# Patient Record
Sex: FEMALE | Race: WHITE | NOT HISPANIC OR LATINO | Employment: UNEMPLOYED | URBAN - METROPOLITAN AREA
[De-identification: names, ages, dates, MRNs, and addresses within clinical notes are randomized per-mention and may not be internally consistent; named-entity substitution may affect disease eponyms.]

---

## 2024-07-11 ENCOUNTER — HOSPITAL ENCOUNTER (EMERGENCY)
Facility: HOSPITAL | Age: 49
Discharge: HOME | End: 2024-07-11

## 2024-07-11 VITALS
OXYGEN SATURATION: 100 % | RESPIRATION RATE: 18 BRPM | DIASTOLIC BLOOD PRESSURE: 93 MMHG | BODY MASS INDEX: 25.31 KG/M2 | HEIGHT: 65 IN | TEMPERATURE: 98.1 F | SYSTOLIC BLOOD PRESSURE: 153 MMHG | HEART RATE: 91 BPM | WEIGHT: 151.9 LBS

## 2024-07-11 DIAGNOSIS — N30.01 ACUTE CYSTITIS WITH HEMATURIA: Primary | ICD-10-CM

## 2024-07-11 LAB
APPEARANCE UR: ABNORMAL
BILIRUB UR STRIP.AUTO-MCNC: NEGATIVE MG/DL
COLOR UR: YELLOW
GLUCOSE UR STRIP.AUTO-MCNC: NORMAL MG/DL
HCG UR QL IA.RAPID: NEGATIVE
KETONES UR STRIP.AUTO-MCNC: NEGATIVE MG/DL
LEUKOCYTE ESTERASE UR QL STRIP.AUTO: ABNORMAL
MUCOUS THREADS #/AREA URNS AUTO: ABNORMAL /LPF
NITRITE UR QL STRIP.AUTO: ABNORMAL
PH UR STRIP.AUTO: 5.5 [PH]
PROT UR STRIP.AUTO-MCNC: ABNORMAL MG/DL
RBC # UR STRIP.AUTO: ABNORMAL /UL
RBC #/AREA URNS AUTO: >20 /HPF
SP GR UR STRIP.AUTO: 1.01
SQUAMOUS #/AREA URNS AUTO: ABNORMAL /HPF
UROBILINOGEN UR STRIP.AUTO-MCNC: NORMAL MG/DL
WBC #/AREA URNS AUTO: >50 /HPF
WBC CLUMPS #/AREA URNS AUTO: ABNORMAL /HPF

## 2024-07-11 PROCEDURE — 81025 URINE PREGNANCY TEST: CPT | Performed by: NURSE PRACTITIONER

## 2024-07-11 PROCEDURE — 99283 EMERGENCY DEPT VISIT LOW MDM: CPT

## 2024-07-11 PROCEDURE — 87086 URINE CULTURE/COLONY COUNT: CPT | Mod: TRILAB,WESLAB | Performed by: NURSE PRACTITIONER

## 2024-07-11 PROCEDURE — 81001 URINALYSIS AUTO W/SCOPE: CPT | Performed by: NURSE PRACTITIONER

## 2024-07-11 RX ORDER — CEPHALEXIN 500 MG/1
500 CAPSULE ORAL ONCE
Status: DISCONTINUED | OUTPATIENT
Start: 2024-07-11 | End: 2024-07-11 | Stop reason: HOSPADM

## 2024-07-11 RX ORDER — CEPHALEXIN 500 MG/1
500 CAPSULE ORAL 3 TIMES DAILY
Qty: 21 CAPSULE | Refills: 0 | Status: SHIPPED | OUTPATIENT
Start: 2024-07-11 | End: 2024-07-18

## 2024-07-11 ASSESSMENT — COLUMBIA-SUICIDE SEVERITY RATING SCALE - C-SSRS
6. HAVE YOU EVER DONE ANYTHING, STARTED TO DO ANYTHING, OR PREPARED TO DO ANYTHING TO END YOUR LIFE?: NO
1. IN THE PAST MONTH, HAVE YOU WISHED YOU WERE DEAD OR WISHED YOU COULD GO TO SLEEP AND NOT WAKE UP?: NO
2. HAVE YOU ACTUALLY HAD ANY THOUGHTS OF KILLING YOURSELF?: NO

## 2024-07-11 ASSESSMENT — PAIN DESCRIPTION - DESCRIPTORS: DESCRIPTORS: PRESSURE

## 2024-07-11 ASSESSMENT — PAIN SCALES - GENERAL: PAINLEVEL_OUTOF10: 5 - MODERATE PAIN

## 2024-07-11 ASSESSMENT — PAIN DESCRIPTION - PROGRESSION: CLINICAL_PROGRESSION: NOT CHANGED

## 2024-07-11 ASSESSMENT — PAIN DESCRIPTION - PAIN TYPE: TYPE: ACUTE PAIN

## 2024-07-11 ASSESSMENT — PAIN DESCRIPTION - ONSET: ONSET: SUDDEN

## 2024-07-11 ASSESSMENT — PAIN DESCRIPTION - FREQUENCY: FREQUENCY: INTERMITTENT

## 2024-07-11 ASSESSMENT — PAIN DESCRIPTION - LOCATION: LOCATION: GROIN

## 2024-07-11 ASSESSMENT — PAIN - FUNCTIONAL ASSESSMENT: PAIN_FUNCTIONAL_ASSESSMENT: 0-10

## 2024-07-11 NOTE — ED PROVIDER NOTES
HPI   Chief Complaint   Patient presents with    Female Dysuria     I have the urge to pee but I do not totally void I have pressure in my pelvis and when I pee it is cloudy this has been going on since July 3 rd       HPI  See my MDM                  [place] Coma Scale Score: 15                     Patient History   Past Medical History:   Diagnosis Date    Benign neoplasm of unspecified breast     Fibroadenoma of breast    Dysplasia of cervix uteri, unspecified 01/23/2015    Cervical dysplasia or atypia    Other abnormal and inconclusive findings on diagnostic imaging of breast 01/29/2015    Abnormal ultrasound of breast    Personal history of other diseases of the digestive system 01/23/2015    History of Crohn's disease    Personal history of other diseases of the female genital tract 01/23/2015    Vaginal delivery    Personal history of other infectious and parasitic diseases 03/05/2018    History of herpes simplex infection    Personal history of other specified conditions 01/29/2015    History of breast lump    Personal history of other specified conditions     Parity 4     Past Surgical History:   Procedure Laterality Date    CERVICAL BIOPSY  W/ LOOP ELECTRODE EXCISION  01/23/2015    Cervical Loop Electrosurgical Excision (LEEP)    INCISIONAL BREAST BIOPSY  11/18/2016    Incisional Breast Biopsy     No family history on file.  Social History     Tobacco Use    Smoking status: Unknown    Smokeless tobacco: Not on file   Substance Use Topics    Alcohol use: Not on file    Drug use: Not on file       Physical Exam   ED Triage Vitals [07/11/24 1719]   Temperature Heart Rate Respirations BP   36.7 °C (98.1 °F) 91 18 (!) 153/93      Pulse Ox Temp Source Heart Rate Source Patient Position   100 % Oral Monitor Sitting      BP Location FiO2 (%)     Left arm --       Physical Exam  CONSTITUTIONAL: Vital signs reviewed as charted, well-developed and in no distress  Eyes: Extraocular muscles are intact. Pupils equal  round and reactive to light. Conjunctiva are pink.    ENT: Mucous membranes are moist. Tongue in the midline. Pharynx was without erythema or exudates, uvula midline  LUNGS: Breath sounds equal and clear to auscultation. Good air exchange, no wheezes rales or retractions, pulse oximetry is charted.  HEART: Regular rate and rhythm without murmur thrill or rub, strong tones, auscultation is normal.  ABDOMEN: Soft and nontender without guarding rebound rigidity or mass. Bowel sounds are present and normal in all quadrants. There is no palpable masses or aneurysms identified. No hepatosplenomegaly, normal abdominal exam.  Neuro: The patient is awake, alert and oriented ×3. Moving all 4 extremities and answering questions appropriately.   MUSCULOSKELETAL: The calves are nontender to palpation. Full gross active range of motion.   PSYCH: Awake alert oriented, normal mood and affect.  Skin:  Dry, normal color, warm to the touch, no rash present.      ED Course & MDM   Diagnoses as of 07/11/24 1801   Acute cystitis with hematuria       Medical Decision Making  History obtained from: patient    Vital signs, nursing notes, current medications, past medical history, Surgical history, allergies, social history, family History were reviewed.         HPI:  Patient 48-year-old female present emergency room today complaining of dysuria urinary frequency and urgency ongoing for about a week.  States pain now is moving in the left side towards her back.  Denies fever chills or night sweats.  Denies nausea vomiting diarrhea.  She is nontoxic well-appearing vital signs positive for hypertension but otherwise unremarkable.      10 point ROS was reviewed and negative except Noted above in HPI.  DDX: as listed above          MDM Summary/considerations:  Labs Reviewed   URINALYSIS WITH REFLEX CULTURE AND MICROSCOPIC - Abnormal       Result Value    Color, Urine Yellow      Appearance, Urine Ex.Turbid (*)     Specific Gravity, Urine 1.015       pH, Urine 5.5      Protein, Urine 200 (2+) (*)     Glucose, Urine Normal      Blood, Urine 0.5 (2+) (*)     Ketones, Urine NEGATIVE      Bilirubin, Urine NEGATIVE      Urobilinogen, Urine Normal      Nitrite, Urine 1+ (*)     Leukocyte Esterase, Urine 500 Daniella/µL (*)    MICROSCOPIC ONLY, URINE - Abnormal    WBC, Urine >50 (*)     WBC Clumps, Urine MANY      RBC, Urine >20 (*)     Squamous Epithelial Cells, Urine 1-9 (SPARSE)      Mucus, Urine 2+     HCG, URINE, QUALITATIVE - Normal    HCG, Urine NEGATIVE     URINE CULTURE   URINALYSIS WITH REFLEX CULTURE AND MICROSCOPIC    Narrative:     The following orders were created for panel order Urinalysis with Reflex Culture and Microscopic.  Procedure                               Abnormality         Status                     ---------                               -----------         ------                     Urinalysis with Reflex C...[[id number]]  Abnormal            Final result               Extra Urine Gray Tube[[id number]]                                                         Please view results for these tests on the individual orders.   EXTRA URINE GRAY TUBE     No orders to display     Medications   cephalexin (Keflex) capsule 500 mg (has no administration in time range)     New Prescriptions    CEPHALEXIN (KEFLEX) 500 MG CAPSULE    Take 1 capsule (500 mg) by mouth 3 times a day for 7 days.       I estimate there is LOW risk for EPIGLOTTITIS, PNEUMONIA, MENINGITIS,thus I consider the discharge disposition reasonable. Also, there is no evidence for peritonitis, sepsis, or toxicity. We have discussed the diagnosis and risks, and we agree with discharging home to follow-up with their primary doctor. We also discussed returning to the Emergency Department immediately if new or worsening symptoms occur. We have discussed the symptoms which are most concerning (e.g., changing or worsening pain, trouble swallowing or breathing, neck stiffness, fever) that  necessitate immediate return.    Urinalysis positive for UTI consistent with infection and exam.  Patient started on antibiotics first dose given here in the ED will follow PCP 1 to 2 days for reevaluation discharged home stable condition.    All of the patient's questions were answered to the best of my ability.  Patient states understanding that they have been screened for an emergency today and we have not found any etiology of symptoms that requires emergent treatment or admission to the hospital at this point. They understand that they have not had definitive care day and require follow-up for treatment of their condition. They also state understanding that they may have an emergent condition that may potentially have not of detected at this visit and they must return to the emergency department if they develop any worsening of symptoms or new complaints.      I have evaluated this patient, my supervising physician was available for consultation.        Critical Care: Not warranted at this time        This chart was completed using voice recognition transcription software. Please excuse any errors of transcription including grammatical, punctuation, syntax and spelling errors.  Please contact me with any questions regarding this chart.    Procedure  Procedures     TOO Prieto-SHERRY  07/11/24 1746

## 2024-07-14 LAB — BACTERIA UR CULT: ABNORMAL

## 2024-07-16 ENCOUNTER — TELEPHONE (OUTPATIENT)
Dept: PHARMACY | Facility: CLINIC | Age: 49
End: 2024-07-16

## 2025-01-29 ENCOUNTER — APPOINTMENT (OUTPATIENT)
Dept: PRIMARY CARE | Facility: CLINIC | Age: 50
End: 2025-01-29

## 2025-01-29 VITALS
SYSTOLIC BLOOD PRESSURE: 134 MMHG | HEIGHT: 65 IN | HEART RATE: 78 BPM | BODY MASS INDEX: 25.99 KG/M2 | WEIGHT: 156 LBS | DIASTOLIC BLOOD PRESSURE: 76 MMHG

## 2025-01-29 DIAGNOSIS — Z12.12 SCREENING FOR COLORECTAL CANCER: ICD-10-CM

## 2025-01-29 DIAGNOSIS — Z00.00 ROUTINE GENERAL MEDICAL EXAMINATION AT A HEALTH CARE FACILITY: Primary | ICD-10-CM

## 2025-01-29 DIAGNOSIS — Z12.31 ENCOUNTER FOR SCREENING MAMMOGRAM FOR MALIGNANT NEOPLASM OF BREAST: ICD-10-CM

## 2025-01-29 DIAGNOSIS — Z12.11 SCREENING FOR COLORECTAL CANCER: ICD-10-CM

## 2025-01-29 DIAGNOSIS — Z01.419 ENCOUNTER FOR ANNUAL ROUTINE GYNECOLOGICAL EXAMINATION: ICD-10-CM

## 2025-01-29 PROBLEM — D64.9 ANEMIA: Status: ACTIVE | Noted: 2025-01-29

## 2025-01-29 PROBLEM — R45.89: Status: RESOLVED | Noted: 2025-01-29 | Resolved: 2025-01-29

## 2025-01-29 PROBLEM — K50.90 CROHN'S DISEASE (MULTI): Status: ACTIVE | Noted: 2025-01-29

## 2025-01-29 PROBLEM — E55.9 VITAMIN D DEFICIENCY: Status: ACTIVE | Noted: 2025-01-29

## 2025-01-29 PROBLEM — O99.019 ANTEPARTUM ANEMIA (HHS-HCC): Status: RESOLVED | Noted: 2025-01-29 | Resolved: 2025-01-29

## 2025-01-29 PROCEDURE — 1036F TOBACCO NON-USER: CPT | Performed by: INTERNAL MEDICINE

## 2025-01-29 PROCEDURE — 99386 PREV VISIT NEW AGE 40-64: CPT | Performed by: INTERNAL MEDICINE

## 2025-01-29 PROCEDURE — 3008F BODY MASS INDEX DOCD: CPT | Performed by: INTERNAL MEDICINE

## 2025-01-29 PROCEDURE — 93000 ELECTROCARDIOGRAM COMPLETE: CPT | Performed by: INTERNAL MEDICINE

## 2025-01-29 ASSESSMENT — ENCOUNTER SYMPTOMS
SLEEP DISTURBANCE: 0
ACTIVITY CHANGE: 0
HYPERACTIVE: 0
ARTHRALGIAS: 0
NECK STIFFNESS: 0
SORE THROAT: 0
DIARRHEA: 0
HEMATURIA: 0
BRUISES/BLEEDS EASILY: 0
NAUSEA: 0
SEIZURES: 0
VOMITING: 0
CHEST TIGHTNESS: 0
DYSURIA: 0
VOICE CHANGE: 0
RHINORRHEA: 0
NERVOUS/ANXIOUS: 0
PALPITATIONS: 0
WHEEZING: 0
DIFFICULTY URINATING: 0
LIGHT-HEADEDNESS: 0
SINUS PAIN: 0
FATIGUE: 0
ABDOMINAL PAIN: 0
COLOR CHANGE: 0
DYSPHORIC MOOD: 0
WEAKNESS: 0
NECK PAIN: 0
CONSTIPATION: 0
SHORTNESS OF BREATH: 0
APPETITE CHANGE: 0
CHILLS: 0
FEVER: 0
MYALGIAS: 0
SINUS PRESSURE: 0
COUGH: 0
EYE ITCHING: 0
ADENOPATHY: 0
DECREASED CONCENTRATION: 0
DIZZINESS: 0
FREQUENCY: 0
HEADACHES: 0
EYE DISCHARGE: 0
ABDOMINAL DISTENTION: 0
BACK PAIN: 0

## 2025-01-29 NOTE — PATIENT INSTRUCTIONS
It was a pleasure seeing you back in the office today.  At a time that is convenient for you, please obtain your bloodwork on a fasting basis.  We will then follow up on these results once available.  Please continue with routine gynecologic exams and annual mammograms.  Someone should be contacting you shortly to help set up a colonoscopy.  All other screening will start age-appropriate times in the future.  Tetanus vaccine should be up-to-date from your pregnancy in 2018, recommended every 10 years.  Other recommended vaccines would be annual flu shots, updated COVID boosters, and the shingles vaccine series starting at age 50.  If you have any questions, please feel free to contact us.  Otherwise, we are happy to see you annually for your wellness visits.

## 2025-01-29 NOTE — PROGRESS NOTES
Lou Danielle comes in today for a comprehensive physical exam.      Ms. Danielle comes in today to reestablish with PCP and for comprehensive physical.  She was last seen in our office in 2019.  She takes no chronic prescription medications and is generally healthy.  She is due for some updated healthcare maintenance studies, would like to set up with a new gynecologist.  She declines vaccines at this time.  She is amenable to updating comprehensive lab studies.  She still has relatively normal menstrual cycles.  She stays active.  She has 3 older children as well as her 6-year-old son.  She denies any headaches, dizziness, chest pain or palpitations, shortness of breath nor cough, nausea, vomiting, nor changes in bowel nor bladder habits.        Review of Systems   Constitutional:  Negative for activity change, appetite change, chills, fatigue and fever.   HENT:  Negative for congestion, ear pain, postnasal drip, rhinorrhea, sinus pressure, sinus pain, sneezing, sore throat, tinnitus and voice change.    Eyes:  Negative for discharge, itching and visual disturbance.   Respiratory:  Negative for cough, chest tightness, shortness of breath and wheezing.    Cardiovascular:  Negative for chest pain, palpitations and leg swelling.   Gastrointestinal:  Negative for abdominal distention, abdominal pain, constipation, diarrhea, nausea and vomiting.   Endocrine: Negative for cold intolerance, heat intolerance and polyuria.   Genitourinary:  Negative for difficulty urinating, dysuria, frequency, hematuria, urgency, vaginal bleeding and vaginal discharge.   Musculoskeletal:  Negative for arthralgias, back pain, myalgias, neck pain and neck stiffness.   Skin:  Negative for color change, pallor and rash.   Allergic/Immunologic: Negative for environmental allergies, food allergies and immunocompromised state.   Neurological:  Negative for dizziness, seizures, syncope, weakness, light-headedness and headaches.    Hematological:  Negative for adenopathy. Does not bruise/bleed easily.   Psychiatric/Behavioral:  Negative for behavioral problems, decreased concentration, dysphoric mood and sleep disturbance. The patient is not nervous/anxious and is not hyperactive.        Objective   Physical Exam  Constitutional:       General: She is not in acute distress.     Appearance: Normal appearance. She is well-developed. She is not ill-appearing.   HENT:      Head: Normocephalic.      Right Ear: Tympanic membrane, ear canal and external ear normal.      Left Ear: Tympanic membrane, ear canal and external ear normal.      Nose: Nose normal. No congestion.      Mouth/Throat:      Mouth: Mucous membranes are moist.      Pharynx: Oropharynx is clear. No oropharyngeal exudate or posterior oropharyngeal erythema.   Eyes:      General: Lids are normal. No scleral icterus.     Extraocular Movements: Extraocular movements intact.      Conjunctiva/sclera: Conjunctivae normal.      Pupils: Pupils are equal, round, and reactive to light.   Neck:      Vascular: No carotid bruit.   Cardiovascular:      Rate and Rhythm: Normal rate and regular rhythm.      Pulses: Normal pulses.      Heart sounds: No murmur heard.     No gallop.   Pulmonary:      Effort: Pulmonary effort is normal. No respiratory distress.      Breath sounds: No wheezing, rhonchi or rales.   Chest:      Comments: Patient declines  Abdominal:      General: Bowel sounds are normal. There is no distension.      Palpations: Abdomen is soft. There is no mass.      Tenderness: There is no abdominal tenderness. There is no guarding or rebound.   Genitourinary:     Comments: Patient declines, prefers to defer to gynecology  Musculoskeletal:         General: No swelling or signs of injury.      Cervical back: Normal range of motion and neck supple. No tenderness.      Right lower leg: No edema.      Left lower leg: No edema.   Lymphadenopathy:      Cervical: No cervical adenopathy.    Skin:     General: Skin is warm and dry.      Coloration: Skin is not pale.      Findings: No bruising or rash.   Neurological:      General: No focal deficit present.      Mental Status: She is alert and oriented to person, place, and time.      Cranial Nerves: No cranial nerve deficit.      Motor: No weakness.      Coordination: Coordination normal.      Gait: Gait normal.   Psychiatric:         Mood and Affect: Mood normal.         Behavior: Behavior normal.         Thought Content: Thought content normal.         Judgment: Judgment normal.         Assessment/Plan   Full age-appropriate comprehensive physical exam and health care maintenance performed and discussed today.  Routine safety and preventative measures discussed including self breast exam, seatbelt use, no drinking and driving, no texting and driving, abstinence or cessation of tobacco use, routine dental and vision exams, healthy diet and regular exercise.    We will update comprehensive labs and follow-up on results once available.  She will return when fasting.  Due for mammogram.  Requisition placed.  Overdue for Pap smear, have offered, she prefers to set up with gynecology.  Due for colonoscopy.  Order placed.  EKG as above.  DEXA screening start age-appropriate times in the future.  Declines flu shot, declines COVID booster.  Could consider shingles vaccine series when she turns 50.  Believes she is up-to-date on her tetanus vaccine likely from her pregnancy in 2018.    Happy to see her back essentially annually.  She is to contact us with any questions in the interim.  Problem List Items Addressed This Visit    None  Visit Diagnoses       Routine general medical examination at a health care facility    -  Primary    Relevant Orders    ECG 12 lead (Clinic Performed)

## 2025-02-19 LAB
25(OH)D3+25(OH)D2 SERPL-MCNC: 37 NG/ML (ref 30–100)
ALBUMIN SERPL-MCNC: 4.3 G/DL (ref 3.6–5.1)
ALP SERPL-CCNC: 80 U/L (ref 31–125)
ALT SERPL-CCNC: 21 U/L (ref 6–29)
ANION GAP SERPL CALCULATED.4IONS-SCNC: 7 MMOL/L (CALC) (ref 7–17)
AST SERPL-CCNC: 16 U/L (ref 10–35)
BASOPHILS # BLD AUTO: 29 CELLS/UL (ref 0–200)
BASOPHILS NFR BLD AUTO: 0.5 %
BILIRUB SERPL-MCNC: 0.7 MG/DL (ref 0.2–1.2)
BUN SERPL-MCNC: 13 MG/DL (ref 7–25)
CALCIUM SERPL-MCNC: 9.1 MG/DL (ref 8.6–10.2)
CHLORIDE SERPL-SCNC: 105 MMOL/L (ref 98–110)
CHOLEST SERPL-MCNC: 192 MG/DL
CHOLEST/HDLC SERPL: 2.9 (CALC)
CO2 SERPL-SCNC: 26 MMOL/L (ref 20–32)
CREAT SERPL-MCNC: 0.63 MG/DL (ref 0.5–0.99)
EGFRCR SERPLBLD CKD-EPI 2021: 109 ML/MIN/1.73M2
EOSINOPHIL # BLD AUTO: 87 CELLS/UL (ref 15–500)
EOSINOPHIL NFR BLD AUTO: 1.5 %
ERYTHROCYTE [DISTWIDTH] IN BLOOD BY AUTOMATED COUNT: 12.5 % (ref 11–15)
EST. AVERAGE GLUCOSE BLD GHB EST-MCNC: 103 MG/DL
EST. AVERAGE GLUCOSE BLD GHB EST-SCNC: 5.7 MMOL/L
FERRITIN SERPL-MCNC: 15 NG/ML (ref 16–232)
GLUCOSE SERPL-MCNC: 87 MG/DL (ref 65–99)
HBA1C MFR BLD: 5.2 % OF TOTAL HGB
HCT VFR BLD AUTO: 35.3 % (ref 35–45)
HDLC SERPL-MCNC: 66 MG/DL
HGB BLD-MCNC: 11.6 G/DL (ref 11.7–15.5)
IRON SATN MFR SERPL: 30 % (CALC) (ref 16–45)
IRON SERPL-MCNC: 119 MCG/DL (ref 40–190)
LDLC SERPL CALC-MCNC: 109 MG/DL (CALC)
LYMPHOCYTES # BLD AUTO: 1752 CELLS/UL (ref 850–3900)
LYMPHOCYTES NFR BLD AUTO: 30.2 %
MCH RBC QN AUTO: 30 PG (ref 27–33)
MCHC RBC AUTO-ENTMCNC: 32.9 G/DL (ref 32–36)
MCV RBC AUTO: 91.2 FL (ref 80–100)
MONOCYTES # BLD AUTO: 389 CELLS/UL (ref 200–950)
MONOCYTES NFR BLD AUTO: 6.7 %
NEUTROPHILS # BLD AUTO: 3544 CELLS/UL (ref 1500–7800)
NEUTROPHILS NFR BLD AUTO: 61.1 %
NONHDLC SERPL-MCNC: 126 MG/DL (CALC)
PLATELET # BLD AUTO: 202 THOUSAND/UL (ref 140–400)
PMV BLD REES-ECKER: 10.4 FL (ref 7.5–12.5)
POTASSIUM SERPL-SCNC: 4.7 MMOL/L (ref 3.5–5.3)
PROT SERPL-MCNC: 6.4 G/DL (ref 6.1–8.1)
RBC # BLD AUTO: 3.87 MILLION/UL (ref 3.8–5.1)
SODIUM SERPL-SCNC: 138 MMOL/L (ref 135–146)
TIBC SERPL-MCNC: 399 MCG/DL (CALC) (ref 250–450)
TRIGL SERPL-MCNC: 80 MG/DL
TSH SERPL-ACNC: 1.35 MIU/L
VIT B12 SERPL-MCNC: 354 PG/ML (ref 200–1100)
WBC # BLD AUTO: 5.8 THOUSAND/UL (ref 3.8–10.8)

## 2025-02-25 ENCOUNTER — APPOINTMENT (OUTPATIENT)
Dept: RADIOLOGY | Facility: CLINIC | Age: 50
End: 2025-02-25
Payer: COMMERCIAL

## 2025-03-10 ENCOUNTER — HOSPITAL ENCOUNTER (OUTPATIENT)
Dept: RADIOLOGY | Facility: CLINIC | Age: 50
Discharge: HOME | End: 2025-03-10
Payer: COMMERCIAL

## 2025-03-10 VITALS — BODY MASS INDEX: 24.96 KG/M2 | WEIGHT: 150 LBS

## 2025-03-10 DIAGNOSIS — Z12.31 ENCOUNTER FOR SCREENING MAMMOGRAM FOR MALIGNANT NEOPLASM OF BREAST: ICD-10-CM

## 2025-03-10 PROCEDURE — 77063 BREAST TOMOSYNTHESIS BI: CPT | Performed by: STUDENT IN AN ORGANIZED HEALTH CARE EDUCATION/TRAINING PROGRAM

## 2025-03-10 PROCEDURE — 77067 SCR MAMMO BI INCL CAD: CPT

## 2025-03-10 PROCEDURE — 77067 SCR MAMMO BI INCL CAD: CPT | Performed by: STUDENT IN AN ORGANIZED HEALTH CARE EDUCATION/TRAINING PROGRAM

## 2025-05-07 ENCOUNTER — APPOINTMENT (OUTPATIENT)
Dept: OBSTETRICS AND GYNECOLOGY | Facility: CLINIC | Age: 50
End: 2025-05-07
Payer: COMMERCIAL

## 2025-05-30 ENCOUNTER — HOSPITAL ENCOUNTER (OUTPATIENT)
Dept: RADIOLOGY | Facility: CLINIC | Age: 50
Discharge: HOME | End: 2025-05-30
Payer: COMMERCIAL

## 2025-05-30 ENCOUNTER — OFFICE VISIT (OUTPATIENT)
Dept: ORTHOPEDIC SURGERY | Facility: CLINIC | Age: 50
End: 2025-05-30
Payer: COMMERCIAL

## 2025-05-30 DIAGNOSIS — M25.551 RIGHT HIP PAIN: ICD-10-CM

## 2025-05-30 DIAGNOSIS — M16.11 PRIMARY OSTEOARTHRITIS OF RIGHT HIP: Primary | ICD-10-CM

## 2025-05-30 PROCEDURE — 99204 OFFICE O/P NEW MOD 45 MIN: CPT | Performed by: STUDENT IN AN ORGANIZED HEALTH CARE EDUCATION/TRAINING PROGRAM

## 2025-05-30 PROCEDURE — 73502 X-RAY EXAM HIP UNI 2-3 VIEWS: CPT | Mod: RT

## 2025-05-30 PROCEDURE — 1036F TOBACCO NON-USER: CPT | Performed by: STUDENT IN AN ORGANIZED HEALTH CARE EDUCATION/TRAINING PROGRAM

## 2025-05-30 ASSESSMENT — PAIN - FUNCTIONAL ASSESSMENT: PAIN_FUNCTIONAL_ASSESSMENT: 0-10

## 2025-05-30 ASSESSMENT — PAIN SCALES - GENERAL: PAINLEVEL_OUTOF10: 3

## 2025-05-30 NOTE — PROGRESS NOTES
Berkley Vo MD   Adult Reconstruction and Joint Replacement Surgery  Phone: [phone]     Fax: [phone]       Name: Lou Danielle  Age: 49 y.o.   : 1975   Date of Visit: 2025    INITIAL CONSULTATION    CC: RIGHT hip pain    HPI:  This patient presents with 15 years of RIGHT hip pain. Had MRI 10 years ago that showed deformity of hip.     Patient has tried the following Activity modification, NSAIDs, Tylenol (arthritis dosing) , Ice, and Xray. Date of last steroid injection: n/a. Patient does have pain at night. Patient does report falls related to this problem. Patient is able to walk indoors only. Patient is currently using nothing as assistive device. Primarily complains of groin pain. Patient has difficulty with stairs. The pain is significantly impacting their ability to perform activities of daily living. Patient reports no longer able to do activities such as ADLS.     Focused History  *Directly transcribed from patient self-reported intake sheet and [facility] Medical Records.      PMH: Reviewed and PE/DVT: no  PSH: Reviewed , Hip/Knee replacement: NO, Hip/Knee surgery: NO, Anesthesia complications: NO, Spine surgery: NO, Surgical infection: NO, and Weight loss surgery: NO  SHx: Reviewed, Occupation: SONOGRAPHER, Current smoker: NO, EtOH intake weekly: NO, Social support: SPOUSE, and Preferred physical activities: NO  Jehovah´s Witness: no  Meds: Reviewed  Allergies: Reviewed  and The patient reports no contraindications or allergies to cephalosporins, aspirin, NSAIDs or opioids, except as noted above.  Dental Hx: Last routine cleanin  FH: No family history of any bleeding or clotting disorders.    PROMS/HISTORY  PROMs   No questionnaires on file.     Medical History[1]    Medical History[2]  Documented in chart and reviewed.     Surgical History[3]    Allergies: She has no known allergies.     Medications:  No current outpatient medications    Family History[4]  Documented  in chart and reviewed.     Social History     Tobacco Use    Smoking status: Former     Types: Cigarettes    Smokeless tobacco: Never   Substance Use Topics    Alcohol use: Not on file     Comment: social        Review of Systems: Review of systems completed with medical assistant intake. Please refer to this note.     Physical Exam:  BMI: 25.    General: The patient is well appearing and has an appropriate affect.     Neurological Examination: SILT in SPN/DPN/Sural/Saphenous/Tibial nerves. 5/5 EHL, FHL, Tibial anterior, Gastrocnemius. Coordination grossly intact.     Cardiovascular Exam: Capillary refill <2 seconds.     Lymphatic Examination: There is no obvious lymphatic swelling present around the involved joint.    Skin Exam: Skin around the pertinent joint is without evidence of infection or rash.    Gait: The patient ambulates with a coxalgic gait.     Lumbar spine:    No tenderness to palpation midline.    Negative straight leg raise bilaterally.    Right Hip Examination:  Gait: Coxalgic gait.    Examination of the hip reveals the skin to be intact.    There is mild tenderness over the greater trochanter.    There is no obvious swelling.    There is a positive Stinchfield test.    Range of motion is: full extension to 105 degrees of flexion.    The hip internally rotates to 35 degrees and externally rotates to 45 degrees.    Abduction is 50 degrees and adduction is 20 degrees.    There is groin and buttock pain with hip motion.    There is a negative straight leg raise.    Abductor strength 4+/5.    Left Hip Examination:  Examination of the hip reveals the skin to be intact.    There is no tenderness over the greater trochanter.    There is no obvious swelling.    There is a negative Stinchfield test.    Range of motion is full extension to 105 degrees of flexion.    The hip internally rotates to 20 and externally rotates 40 degrees.    Abduction is 50 degrees and adduction is 20 degrees.    There is no groin  "and buttock pain with hip motion.    There is a negative straight leg raise.    Abductor strength 5/5.    Right Knee Examination:  Examination of the right knee reveals the skin to be intact. There is no obvious swelling.    There is no tenderness to palpation.    Range of motion is 5 hyper extension to 135 degrees of flexion.    The knee is stable.    There is no grinding with range of motion.    There is no patellofemoral crepitus.    Left Knee Examination:  Examination of the left knee reveals the skin to be intact. There is no obvious swelling.    There is no tenderness to palpation.    Range of motion is 5 hyper extension to 135 degrees of flexion.    The knee is stable.    There is no grinding with range of motion.    There is no patellofemoral crepitus.    Prior Labs:   Prior Labs:   Lab Results   Component Value Date    WBC 5.8 02/18/2025    HGB 11.6 (L) 02/18/2025    HCT 35.3 02/18/2025    MCV 91.2 02/18/2025     02/18/2025      No results found for: \"INR\", \"PROTIME\"      Lab Results   Component Value Date    GLUCOSE 87 02/18/2025    CALCIUM 9.1 02/18/2025     02/18/2025    K 4.7 02/18/2025    CO2 26 02/18/2025     02/18/2025    BUN 13 02/18/2025    CREATININE 0.63 02/18/2025      Lab Results   Component Value Date    TROPONINI <0.02 07/20/2021      Lab Results   Component Value Date    HGBA1C 5.2 02/18/2025         No results found for: \"CRP\"   No results found for: \"SEDRATE\"      Radiographs:  Radiographs were personally reviewed today with the patient. There is evidence of mild  RIGHT  hip osteoarthritis without bone on bone apposition.  Small cam and pincer lesions.    Impression:  This patient presents with mild RIGHT hip osteoarthritis without bone on bone apposition.     Diagnosis:   Primary osteoarthritis of right hip    Right hip pain     Recommendations / Plan:    I have discussed the options in detail with the patient. We have discussed anti-inflammatory medication, activity " modification, physical therapy, corticosteroid injections, and total hip replacement surgery. The patient has not yet exhausted all conservative treatment measures.    The risks and benefits of all these treatment options have been discussed in detail.     A physical therapy prescription was ordered for the patient.  Patient will continue their home exercise program. Strategies for pain management using over-the-counter anti-inflammatory medications reviewed.  Discussed the potential benefit of a steroid injection and the patient was referred to my nonsurgical sports partners for consideration. Encouraged them to maintain range of motion and strength around the hip and knee joints.  They will continue to implement these strategies in addressing their pain.      Recommend the patient continue optimizing nonsurgical treatment interventions as outlined above for management of their arthritis.  I would be happy to see them again at any point to discuss surgery if indicated or they are more optimized or to review progress with nonsurgical treatment of arthritis.  The patient verbalizes understanding with the recommendations and treatment plan as outlined above and is in agreement.  Questions were addressed.    _____________  Berkley Vo MD   Attending Orthopaedic Surgeon  [facility]    [facility]       This office note was transcribed with dictation software.  Please excuse any typographical errors, program misunderstandings leading to inadvertent insertions or deletions of inappropriate wording, pronoun errors and other unintentional transcription errors not noticed on proof-reading.                    [1]   Past Medical History:  Diagnosis Date    Antepartum anemia ([facility]) 01/29/2025    Crohn's colitis (Multi)     Dysplasia of cervix uteri, unspecified 01/23/2015    Cervical dysplasia or atypia    Fibroadenoma of breast     Herpes simplex infection      Intrauterine growth restriction (IUGR) affecting care of mother ([facility]) 10/16/2013    Parity 4     Personal history of cervical dysplasia     Postpartum emotional lability ([facility]) 01/29/2025   [2]   Past Medical History:  Diagnosis Date    Antepartum anemia ([facility]) 01/29/2025    Crohn's colitis (Multi)     Dysplasia of cervix uteri, unspecified 01/23/2015    Cervical dysplasia or atypia    Fibroadenoma of breast     Herpes simplex infection     Intrauterine growth restriction (IUGR) affecting care of mother ([facility]) 10/16/2013    Parity 4     Personal history of cervical dysplasia     Postpartum emotional lability ([facility]) 01/29/2025   [3]   Past Surgical History:  Procedure Laterality Date    BREAST BIOPSY  2014    CERVICAL BIOPSY  W/ LOOP ELECTRODE EXCISION  01/23/2015    Cervical Loop Electrosurgical Excision (LEEP)    INCISIONAL BREAST BIOPSY  11/18/2016    Incisional Breast Biopsy   [4]   Family History  Problem Relation Name Age of Onset    Breast cancer Mother      Melanoma Father      Other (dextrocardia) Daughter      Ovarian cancer Other      Diabetes Other

## 2025-06-06 ENCOUNTER — APPOINTMENT (OUTPATIENT)
Dept: ORTHOPEDIC SURGERY | Facility: CLINIC | Age: 50
End: 2025-06-06
Payer: COMMERCIAL

## 2025-07-09 ENCOUNTER — OFFICE VISIT (OUTPATIENT)
Dept: ORTHOPEDIC SURGERY | Facility: HOSPITAL | Age: 50
End: 2025-07-09
Payer: COMMERCIAL

## 2025-07-09 DIAGNOSIS — M16.11 PRIMARY OSTEOARTHRITIS OF RIGHT HIP: Primary | ICD-10-CM

## 2025-07-09 DIAGNOSIS — M25.551 RIGHT HIP PAIN: ICD-10-CM

## 2025-07-09 PROCEDURE — 99202 OFFICE O/P NEW SF 15 MIN: CPT | Performed by: STUDENT IN AN ORGANIZED HEALTH CARE EDUCATION/TRAINING PROGRAM

## 2025-07-09 PROCEDURE — 99204 OFFICE O/P NEW MOD 45 MIN: CPT | Performed by: STUDENT IN AN ORGANIZED HEALTH CARE EDUCATION/TRAINING PROGRAM

## 2025-07-09 RX ORDER — METHYLPREDNISOLONE 4 MG/1
4 TABLET ORAL ONCE
Qty: 21 TABLET | Refills: 0 | Status: SHIPPED | OUTPATIENT
Start: 2025-07-09 | End: 2025-07-09

## 2025-07-09 ASSESSMENT — PAIN SCALES - GENERAL: PAINLEVEL_OUTOF10: 1

## 2025-07-09 ASSESSMENT — PAIN - FUNCTIONAL ASSESSMENT: PAIN_FUNCTIONAL_ASSESSMENT: 0-10

## 2025-07-09 NOTE — PROGRESS NOTES
"  Assessment     Shahram is a 49 y.o. female with no significant past medical history, who presents with right hip pain.  The patient's symptoms, clinical exam and imaging studies are suggestive of right hip osteoarthritis.      Plan     At this time, I would like to make the following recommendation/plan:  -  Physical Therapy: Pending scheduling of physical therapy  -  Medication: Medrol Dosepak ordered(patient advised not to take NSAIDs while on steroids)  -  Injections: Given that patient's symptoms have resolved this week will not proceed with steroid injection into her hip however discussed that if pain is to recur happy to fit her in for an injection  -  Studies: Interpreted: X-ray of right hip with mild degeneration at the hip. no LOKI  -  Reviewed Dr. Vo's note from 5/30/2025:\" I have discussed the options in detail with the patient. We have discussed anti-inflammatory medication, activity modification, physical therapy, corticosteroid injections, and total hip replacement surgery. The patient has not yet exhausted all conservative treatment measures... Recommend the patient continue optimizing nonsurgical treatment interventions as outlined above for management of their arthritis. I would be happy to see them again at any point to discuss surgery if indicated or they are more optimized or to review progress with nonsurgical treatment of arthritis. The patient verbalizes understanding with the recommendations and treatment plan as outlined above and is in agreement. Questions were addressed.\"    Follow up:  Follow up in as needed.    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 Pain first started 10 years ago; worsened over the last 2 months, without inciting event.-- however resolved this week.  The pain as located in the right groin " ".    Pain:        Severity:  Lou Danielle \"Shahram\" states pain is: 10/10 at it's worst. On average pain is 1/10 (Scale 0-no pain, 10-worst pain)      Quality:  dull, aching, and burning.       Radiating: non-radiating      Aggravating Factors: stepping wrong, stretching certain kind of way      Alleviating Factors:  taking medications tylenol.      Time of day: regardless of time of day      Associated symptoms:  intermittent numbness and tingling in the right toes; No balance problems; episodes with inability to ambulate    Physical Therapy/Occupational Therapy/Other Modalities:    - Chiropractor/OMT: hasn't tried  - Acupuncture: hasn't tried  - Medical message: hasn't tried   - Last PT session: Was never scheduled       Topicals:   ICE/Heat: unclear benefit  Topicals (Capsicin/Diclofenac gel/Salonpas/Lidocaine): asper cream with lidocaine without benefit /lidocaine patches    Medications:   - Over the counter : (Tylenol, NSAIDs)  tylenol helpful.  Better than ibuprofen  - Steroid: hasn't tried   - Gabapentin/Lyrica: hasn't tried   - Muscle relaxer:  hasn't tried   - Duloxetine/Topamax/oxcarbazepine:  hasn't tried   - Opiates: hasn't tried     Current Medications[1]    RX Allergies[2]    Injections:    Date/Injection Type/Location/%relief/ Lasting  - No history of CSI  Surgery:  Date/Type/Location/%relief/ Lasting  - no hx of orthopedic surgeries   Surgical History[3]    Medical History[4]      ROS:  - Sleep: Sleep is disrupted secondary to pain  - Bowel/Bladder: Denies bowel/bladder dysfunction  - Falls: reports frequent (weekly) falls  - Weight changes: reports 20 lbs wt gain over last year  - Mood/Psych: Denies any feelings of anxiety or depression    12-point review of systems was completed and is otherwise negative except as noted in the HPI.      Social Hx:  - Home: Lives with  and children in a house.   - ADLs: Independent in ADLs and ambulation without assistive device. (Reports feeling like she " needs one)  - Hobbies: no  - Work:  ultrasound tech  - Smoking/Alcohol/Drugs: No/wine 2x week/No    Objective     Physical Exam:  General:  Appears state age, in NAD, and well nourished  Psychological:  Normal mood and affect  Pulm:  Breathing comfortably on RA    Gait:   - Normal  - Without assistive device   - able to heel walk, able to toe walk    Inspection:   - No  erythema, swelling/edema, ecchymosis or deformity noted  - normal muscle bulk of bilateral lower extremity     Sensation:  - Intact to light touch in bilateral lower extremity    Palpation:  - No tenderness to palpation of bilateral greater trochanter  - No tenderness to palpation of bilateral sacroiliac joint  - No tenderness to palpation of bilateral piriformis    Range of Motion:  - Full painless bilateral flexion/extension  - Full painless bilateral hip flexion/internal rotation/external rotation    Strength:  Side Hip Flexion (L2) Knee Extension (L3) Hip Adduction  (L2-L4) Hip Abduction  (L5-S1)   Right 5 5 5 5   Left 5 5 5 5     Reflexes:  Side Patellar (L4) Achilles (S1)  Medial hamstring(L5)   Right 2+ 2+ NT   Left 2+ 2+ NT     Special tests:  - LINDSEY: Negative bilaterally  - FADIR: Negative bilaterally  - Hip (Stinchfield, Log Roll, Scour): Negative bilaterally    Imaging and Other Studies:    Imaging  BN MRI HIPS WO; 1/20/2017 4:24 pm     INDICATION:  Right hip pain     COMPARISON:  Radiograph dated 11/18/2016     ACCESSION NUMBER(S):  [id number]     ORDERING CLINICIAN:  TIANA DAMIAN     TECHNIQUE:  MR imaging of the pelvis and bilateral hipswas obtained without  administration of intravenous contrast medium.     FINDINGS:     Evaluation of the left hip is limited due to lack of joint effusion  intravenous contrast; however, within this limitation there is no MR  evidence of internal derangement.     TENDONS:  Right: The gluteus medius and gluteus minimus tendinous insertions on  the greater trochanter are intact. The iliopsoas tendon is  intact.  The adductor tendon origin is intact. The hamstring tendon origin is  intact.     JOINTS:  Right: Mild osteoarthritic changes of the right hip joint is seen  with subchondral cystic changes at the superior acetabulum. No  cartilaginous defect is seen. There is acetabular labral tearing the  superior and anterior superior aspect with a small paralabral cyst  anterosuperiorly. 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 The  alpha angle measures 61, increased. There is no acetabular  retroversion. No evidence of joint effusion. No trochanteric bursitis  present.     The sacroiliac joint appear unremarkable and without evidence of  erosion or significant degenerative change. The pubic symphysis is  unremarkable.     OSSEOUS STRUCTURES:  No focal marrow replacing lesions are identified. There is no  fracture.     SOFT TISSUES:  Muscles: No muscle atrophy or tear seen.     Nerves: The sciatic nerve is intact and unremarkable.     INTERNAL ORGANS:  Limited evaluation of the internal organs of the pelvis does not  demonstrate a focal abnormality, although note is made that the study  is tailored for evaluation of the musculoskeletal structures of the  pelvis.     Incidental note is made of a 1.2 cm hemorrhagic cyst in the left  labia most likely representing a small Bartholin cyst. Correlate  clinically. There is also a 3 cm left ovarian cyst, physiologic.     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     Right  hip osteoarthritic changes characterized by joint space  narrowing, subchondral cystic changes.     Superior and anterior superior right labral tear with a small  paralabral cyst.              [1] No current outpatient medications on file.  [2] No Known Allergies  [3]   Past Surgical History:  Procedure Laterality Date    BREAST BIOPSY  2014    CERVICAL BIOPSY  W/ LOOP ELECTRODE EXCISION  01/23/2015    Cervical Loop Electrosurgical Excision  (LEEP)    INCISIONAL BREAST BIOPSY  11/18/2016    Incisional Breast Biopsy   [4]   Past Medical History:  Diagnosis Date    Antepartum anemia 01/29/2025    Crohn's colitis (Multi)     Dysplasia of cervix uteri, unspecified 01/23/2015    Cervical dysplasia or atypia    Fibroadenoma of breast     Herpes simplex infection     Intrauterine growth restriction (IUGR) affecting care of mother ([facility]) 10/16/2013    Parity 4     Personal history of cervical dysplasia     Postpartum emotional lability ([facility]) 01/29/2025

## 2025-08-13 ENCOUNTER — OFFICE VISIT (OUTPATIENT)
Dept: ORTHOPEDIC SURGERY | Facility: HOSPITAL | Age: 50
End: 2025-08-13
Payer: COMMERCIAL

## 2025-08-13 ENCOUNTER — HOSPITAL ENCOUNTER (OUTPATIENT)
Dept: RADIOLOGY | Facility: EXTERNAL LOCATION | Age: 50
Discharge: HOME | End: 2025-08-13

## 2025-08-13 DIAGNOSIS — M16.11 PRIMARY OSTEOARTHRITIS OF RIGHT HIP: Primary | ICD-10-CM

## 2025-08-13 PROCEDURE — 2500000004 HC RX 250 GENERAL PHARMACY W/ HCPCS (ALT 636 FOR OP/ED): Performed by: STUDENT IN AN ORGANIZED HEALTH CARE EDUCATION/TRAINING PROGRAM

## 2025-08-13 PROCEDURE — 20611 DRAIN/INJ JOINT/BURSA W/US: CPT | Mod: RT | Performed by: STUDENT IN AN ORGANIZED HEALTH CARE EDUCATION/TRAINING PROGRAM

## 2025-08-13 RX ORDER — LIDOCAINE HYDROCHLORIDE 10 MG/ML
1.5 INJECTION, SOLUTION INFILTRATION; PERINEURAL
Status: COMPLETED | OUTPATIENT
Start: 2025-08-13 | End: 2025-08-13

## 2025-08-13 RX ORDER — ROPIVACAINE HYDROCHLORIDE 5 MG/ML
3 INJECTION, SOLUTION EPIDURAL; INFILTRATION; PERINEURAL
Status: COMPLETED | OUTPATIENT
Start: 2025-08-13 | End: 2025-08-13

## 2025-08-13 RX ORDER — METHYLPREDNISOLONE ACETATE 40 MG/ML
40 INJECTION, SUSPENSION INTRA-ARTICULAR; INTRALESIONAL; INTRAMUSCULAR; SOFT TISSUE
Status: COMPLETED | OUTPATIENT
Start: 2025-08-13 | End: 2025-08-13

## 2025-08-13 RX ADMIN — ROPIVACAINE HYDROCHLORIDE 3 ML: 5 INJECTION, SOLUTION EPIDURAL; INFILTRATION; PERINEURAL at 13:45

## 2025-08-13 RX ADMIN — LIDOCAINE HYDROCHLORIDE 1.5 ML: 10 INJECTION, SOLUTION INFILTRATION; PERINEURAL at 13:45

## 2025-08-13 RX ADMIN — METHYLPREDNISOLONE ACETATE 40 MG: 40 INJECTION, SUSPENSION INTRA-ARTICULAR; INTRALESIONAL; INTRAMUSCULAR; INTRASYNOVIAL; SOFT TISSUE at 13:45

## 2025-08-21 ENCOUNTER — APPOINTMENT (OUTPATIENT)
Dept: DERMATOLOGY | Facility: CLINIC | Age: 50
End: 2025-08-21
Payer: COMMERCIAL

## 2025-10-03 ENCOUNTER — APPOINTMENT (OUTPATIENT)
Dept: OBSTETRICS AND GYNECOLOGY | Facility: CLINIC | Age: 50
End: 2025-10-03
Payer: COMMERCIAL